# Patient Record
Sex: FEMALE | Race: OTHER | NOT HISPANIC OR LATINO | URBAN - METROPOLITAN AREA
[De-identification: names, ages, dates, MRNs, and addresses within clinical notes are randomized per-mention and may not be internally consistent; named-entity substitution may affect disease eponyms.]

---

## 2021-09-08 ENCOUNTER — OFFICE VISIT (OUTPATIENT)
Dept: URBAN - METROPOLITAN AREA TELEHEALTH 2 | Facility: TELEHEALTH | Age: 61
End: 2021-09-08
Payer: MEDICARE

## 2021-09-08 ENCOUNTER — LAB OUTSIDE AN ENCOUNTER (OUTPATIENT)
Dept: URBAN - METROPOLITAN AREA TELEHEALTH 2 | Facility: TELEHEALTH | Age: 61
End: 2021-09-08

## 2021-09-08 ENCOUNTER — TELEPHONE ENCOUNTER (OUTPATIENT)
Dept: URBAN - NONMETROPOLITAN AREA CLINIC 13 | Facility: CLINIC | Age: 61
End: 2021-09-08

## 2021-09-08 DIAGNOSIS — R13.19 CERVICAL DYSPHAGIA: ICD-10-CM

## 2021-09-08 DIAGNOSIS — K21.00 GASTROESOPHAGEAL REFLUX DISEASE WITH ESOPHAGITIS WITHOUT HEMORRHAGE: ICD-10-CM

## 2021-09-08 DIAGNOSIS — Z86.010 PERSONAL HISTORY OF COLONIC POLYPS: ICD-10-CM

## 2021-09-08 PROCEDURE — 99214 OFFICE O/P EST MOD 30 MIN: CPT | Performed by: NURSE PRACTITIONER

## 2021-09-08 RX ORDER — ESOMEPRAZOLE MAGNESIUM 40 MG
TAKE 1 CAPSULE (40 MG) BY ORAL ROUTE ONCE DAILY CAPSULE,DELAYED RELEASE (ENTERIC COATED) ORAL
Qty: 90 | Refills: 3 | Status: ON HOLD | COMMUNITY
Start: 2019-04-19

## 2021-09-08 RX ORDER — ESOMEPRAZOLE MAGNESIUM 20 MG/1
1 CAPSULE CAPSULE, DELAYED RELEASE ORAL ONCE A DAY
Qty: 90 CAPSULE | Refills: 3 | OUTPATIENT
Start: 2021-09-08

## 2021-09-08 RX ORDER — SERTRALINE HCL 50 MG
TABLET ORAL
Qty: 0 | Refills: 0 | Status: ACTIVE | COMMUNITY
Start: 1900-01-01

## 2021-09-08 RX ORDER — BUPROPION HYDROCHLORIDE 150 MG/1
TABLET, FILM COATED ORAL
Qty: 0 | Refills: 0 | Status: ACTIVE | COMMUNITY
Start: 1900-01-01

## 2021-09-08 NOTE — HPI-TODAY'S VISIT:
Keira presents for follow up of reflux, dysphagia and colon cancer screening. She states she has had increased dysphagia. This is worse to solids. She is off nexium 40 mg daily. She states she does have some breakthrough reflux. Her bowels are moving regularly on magnesium supplements. Her last EGD was in 2018 with schatsky's ring s/p dilation. She does feel like she needs a repeat dilation. Her last colonoscopy was in 2010 by Dr. Gonzalez and normal. She does have a history of polyps. Today her main issues is dysphagia. MB

## 2021-12-01 ENCOUNTER — CLAIMS CREATED FROM THE CLAIM WINDOW (OUTPATIENT)
Dept: URBAN - METROPOLITAN AREA CLINIC 4 | Facility: CLINIC | Age: 61
End: 2021-12-01
Payer: MEDICARE

## 2021-12-01 ENCOUNTER — OFFICE VISIT (OUTPATIENT)
Dept: URBAN - NONMETROPOLITAN AREA SURGERY CENTER 1 | Facility: SURGERY CENTER | Age: 61
End: 2021-12-01
Payer: MEDICARE

## 2021-12-01 DIAGNOSIS — K21.9 GASTRO-ESOPHAGEAL REFLUX DISEASE WITHOUT ESOPHAGITIS: ICD-10-CM

## 2021-12-01 DIAGNOSIS — K31.89 DEFORMED PYLORUS, ACQUIRED: ICD-10-CM

## 2021-12-01 DIAGNOSIS — K31.89 ACQUIRED DEFORMITY OF DUODENUM: ICD-10-CM

## 2021-12-01 DIAGNOSIS — K29.70 GASTRITIS, UNSPECIFIED, WITHOUT BLEEDING: ICD-10-CM

## 2021-12-01 DIAGNOSIS — K22.2 ACQUIRED ESOPHAGEAL RING: ICD-10-CM

## 2021-12-01 DIAGNOSIS — K21.9 ACID REFLUX: ICD-10-CM

## 2021-12-01 PROCEDURE — 43249 ESOPH EGD DILATION <30 MM: CPT | Performed by: INTERNAL MEDICINE

## 2021-12-01 PROCEDURE — 88312 SPECIAL STAINS GROUP 1: CPT | Performed by: PATHOLOGY

## 2021-12-01 PROCEDURE — 43239 EGD BIOPSY SINGLE/MULTIPLE: CPT | Performed by: INTERNAL MEDICINE

## 2021-12-01 PROCEDURE — G8907 PT DOC NO EVENTS ON DISCHARG: HCPCS | Performed by: INTERNAL MEDICINE

## 2021-12-01 PROCEDURE — 88305 TISSUE EXAM BY PATHOLOGIST: CPT | Performed by: PATHOLOGY

## 2021-12-29 ENCOUNTER — WEB ENCOUNTER (OUTPATIENT)
Dept: URBAN - NONMETROPOLITAN AREA CLINIC 2 | Facility: CLINIC | Age: 61
End: 2021-12-29

## 2021-12-29 RX ORDER — ESOMEPRAZOLE MAGNESIUM 20 MG/1
1 CAPSULE CAPSULE, DELAYED RELEASE ORAL ONCE A DAY
Qty: 90 CAPSULE | Refills: 3
Start: 2021-09-08

## 2022-01-03 ENCOUNTER — WEB ENCOUNTER (OUTPATIENT)
Dept: URBAN - NONMETROPOLITAN AREA CLINIC 2 | Facility: CLINIC | Age: 62
End: 2022-01-03

## 2023-08-30 ENCOUNTER — WEB ENCOUNTER (OUTPATIENT)
Dept: URBAN - NONMETROPOLITAN AREA CLINIC 2 | Facility: CLINIC | Age: 63
End: 2023-08-30

## 2023-08-30 RX ORDER — ESOMEPRAZOLE MAGNESIUM 20 MG/1
1 CAPSULE CAPSULE, DELAYED RELEASE ORAL ONCE A DAY
Qty: 90 CAPSULE | Refills: 3
Start: 2021-09-08

## 2023-09-05 ENCOUNTER — OFFICE VISIT (OUTPATIENT)
Dept: URBAN - NONMETROPOLITAN AREA CLINIC 2 | Facility: CLINIC | Age: 63
End: 2023-09-05

## 2023-09-13 ENCOUNTER — TELEPHONE ENCOUNTER (OUTPATIENT)
Dept: URBAN - NONMETROPOLITAN AREA CLINIC 2 | Facility: CLINIC | Age: 63
End: 2023-09-13

## 2023-09-15 ENCOUNTER — LAB OUTSIDE AN ENCOUNTER (OUTPATIENT)
Dept: URBAN - NONMETROPOLITAN AREA CLINIC 2 | Facility: CLINIC | Age: 63
End: 2023-09-15

## 2023-09-15 ENCOUNTER — OFFICE VISIT (OUTPATIENT)
Dept: URBAN - NONMETROPOLITAN AREA CLINIC 2 | Facility: CLINIC | Age: 63
End: 2023-09-15
Payer: MEDICARE

## 2023-09-15 ENCOUNTER — WEB ENCOUNTER (OUTPATIENT)
Dept: URBAN - NONMETROPOLITAN AREA CLINIC 2 | Facility: CLINIC | Age: 63
End: 2023-09-15

## 2023-09-15 VITALS
DIASTOLIC BLOOD PRESSURE: 93 MMHG | SYSTOLIC BLOOD PRESSURE: 149 MMHG | HEIGHT: 64 IN | TEMPERATURE: 98.7 F | BODY MASS INDEX: 27.79 KG/M2 | HEART RATE: 73 BPM | WEIGHT: 162.8 LBS

## 2023-09-15 DIAGNOSIS — R13.10 DYSPHAGIA: ICD-10-CM

## 2023-09-15 DIAGNOSIS — K21.00 GASTROESOPHAGEAL REFLUX DISEASE WITH ESOPHAGITIS WITHOUT HEMORRHAGE: ICD-10-CM

## 2023-09-15 DIAGNOSIS — K31.1 PYLORIC STENOSIS: ICD-10-CM

## 2023-09-15 DIAGNOSIS — K62.5 RECTAL BLEEDING: ICD-10-CM

## 2023-09-15 PROBLEM — 367403001: Status: ACTIVE | Noted: 2023-09-15

## 2023-09-15 PROBLEM — 447731000: Status: ACTIVE | Noted: 2023-09-15

## 2023-09-15 PROBLEM — 266433003: Status: ACTIVE | Noted: 2021-09-08

## 2023-09-15 PROCEDURE — 99214 OFFICE O/P EST MOD 30 MIN: CPT | Performed by: NURSE PRACTITIONER

## 2023-09-15 RX ORDER — SOD SULF/POT CHLORIDE/MAG SULF 1.479 G
12 TABLETS THE FIRST DOSE THE EVENING BEFORE AND SECOND DOSE THE MORNING OF COLONOSCOPY TABLET ORAL TWICE A DAY
Qty: 24 TABLET | Refills: 0 | OUTPATIENT
Start: 2023-09-15 | End: 2023-09-16

## 2023-09-15 RX ORDER — HYDROCORTISONE ACETATE 25 MG/1
1 SUPPOSITORY SUPPOSITORY RECTAL THREE TIMES A DAY
Qty: 42 SUPPOSITORIES | Refills: 0 | OUTPATIENT
Start: 2023-09-15 | End: 2023-09-29

## 2023-09-15 RX ORDER — ESOMEPRAZOLE MAGNESIUM 40 MG
TAKE 1 CAPSULE (40 MG) BY ORAL ROUTE ONCE DAILY CAPSULE,DELAYED RELEASE (ENTERIC COATED) ORAL
Qty: 90 | Refills: 3 | Status: ON HOLD | COMMUNITY
Start: 2019-04-19

## 2023-09-15 RX ORDER — SERTRALINE HCL 50 MG
TABLET ORAL
Qty: 0 | Refills: 0 | Status: ACTIVE | COMMUNITY
Start: 1900-01-01

## 2023-09-15 RX ORDER — BUPROPION HYDROCHLORIDE 150 MG/1
TABLET, FILM COATED ORAL
Qty: 0 | Refills: 0 | Status: ACTIVE | COMMUNITY
Start: 1900-01-01

## 2023-09-15 RX ORDER — ESOMEPRAZOLE MAGNESIUM 20 MG/1
1 CAPSULE CAPSULE, DELAYED RELEASE ORAL ONCE A DAY
Qty: 90 CAPSULE | Refills: 3 | Status: ACTIVE | COMMUNITY
Start: 2021-09-08

## 2023-09-15 NOTE — HPI-TODAY'S VISIT:
Keira presents for follow up of reflux, dysphagia and colon cancer screening. She states she has had increased dysphagia. This is worse to solids. She is off nexium 40 mg daily. She states she does have some breakthrough reflux. Her bowels are moving regularly on magnesium supplements. Her last EGD was in 2018 with schatsky's ring s/p dilation. She does feel like she needs a repeat dilation. Her last colonoscopy was in 2010 by Dr. Gonzalez and normal. She does have a history of polyps. Today her main issues is dysphagia. MB 9/15/2023 Keira presents for evaluation of new onset rectal bleeding.  Last week she developed bright red blood per rectum x3 days.  Her last colonoscopy was in 2010 with fair prep.  When she was seen in 2018 and 2021 we have discussed repeating however she did not follow-up.  She denies any change in bowel habits.  Her reflux and dysphagia are stable on Nexium 20 mg just as needed.  Today she does agree to labs, she does have a history of hemorrhoids status post banding x2 in the past.  We will start steroid suppositories and schedule colonoscopy.  MB

## 2023-09-17 LAB
ABSOLUTE BASOPHILS: 21
ABSOLUTE EOSINOPHILS: 80
ABSOLUTE LYMPHOCYTES: 1765
ABSOLUTE MONOCYTES: 419
ABSOLUTE NEUTROPHILS: 3016
BASOPHILS: 0.4
EOSINOPHILS: 1.5
HEMATOCRIT: 41.8
HEMOGLOBIN: 13.8
LYMPHOCYTES: 33.3
MCH: 29.1
MCHC: 33
MCV: 88.2
MONOCYTES: 7.9
MPV: 9.4
NEUTROPHILS: 56.9
PLATELET COUNT: 248
RDW: 12.3
RED BLOOD CELL COUNT: 4.74
WHITE BLOOD CELL COUNT: 5.3

## 2023-09-20 ENCOUNTER — CLAIMS CREATED FROM THE CLAIM WINDOW (OUTPATIENT)
Dept: URBAN - NONMETROPOLITAN AREA SURGERY CENTER 1 | Facility: SURGERY CENTER | Age: 63
End: 2023-09-20
Payer: MEDICARE

## 2023-09-20 DIAGNOSIS — Z12.11 COLON CANCER SCREENING: ICD-10-CM

## 2023-09-20 DIAGNOSIS — K64.0 FIRST DEGREE HEMORRHOIDS: ICD-10-CM

## 2023-09-20 DIAGNOSIS — Z83.71 FAMILY HISTORY OF POLYPS IN THE COLON: ICD-10-CM

## 2023-09-20 PROCEDURE — G8907 PT DOC NO EVENTS ON DISCHARG: HCPCS | Performed by: INTERNAL MEDICINE

## 2023-09-20 PROCEDURE — 00811 ANES LWR INTST NDSC NOS: CPT | Performed by: NURSE ANESTHETIST, CERTIFIED REGISTERED

## 2023-09-20 PROCEDURE — G0121 COLON CA SCRN NOT HI RSK IND: HCPCS | Performed by: INTERNAL MEDICINE

## 2023-12-06 ENCOUNTER — OFFICE VISIT (OUTPATIENT)
Dept: URBAN - METROPOLITAN AREA TELEHEALTH 2 | Facility: TELEHEALTH | Age: 63
End: 2023-12-06
Payer: MEDICARE

## 2023-12-06 ENCOUNTER — DASHBOARD ENCOUNTERS (OUTPATIENT)
Age: 63
End: 2023-12-06

## 2023-12-06 DIAGNOSIS — K62.5 RECTAL BLEEDING: ICD-10-CM

## 2023-12-06 DIAGNOSIS — K21.00 GASTROESOPHAGEAL REFLUX DISEASE WITH ESOPHAGITIS WITHOUT HEMORRHAGE: ICD-10-CM

## 2023-12-06 DIAGNOSIS — K59.04 CHRONIC IDIOPATHIC CONSTIPATION: ICD-10-CM

## 2023-12-06 DIAGNOSIS — R13.10 DYSPHAGIA: ICD-10-CM

## 2023-12-06 PROBLEM — 428283002 HISTORY OF POLYP OF COLON (SITUATION): Status: ACTIVE | Noted: 2021-09-08

## 2023-12-06 PROBLEM — 82934008: Status: ACTIVE | Noted: 2023-12-06

## 2023-12-06 PROBLEM — 12063002: Status: ACTIVE | Noted: 2023-09-15

## 2023-12-06 PROCEDURE — 99214 OFFICE O/P EST MOD 30 MIN: CPT | Performed by: NURSE PRACTITIONER

## 2023-12-06 RX ORDER — BUPROPION HYDROCHLORIDE 150 MG/1
TABLET, FILM COATED ORAL
Qty: 0 | Refills: 0 | Status: ACTIVE | COMMUNITY
Start: 1900-01-01

## 2023-12-06 RX ORDER — ESOMEPRAZOLE MAGNESIUM 20 MG/1
1 CAPSULE CAPSULE, DELAYED RELEASE ORAL ONCE A DAY
Qty: 90 CAPSULE | Refills: 3 | Status: ACTIVE | COMMUNITY
Start: 2021-09-08

## 2023-12-06 RX ORDER — HYDROCORTISONE ACETATE 25 MG/1
1 SUPPOSITORY SUPPOSITORY RECTAL THREE TIMES A DAY
Qty: 42 SUPPOSITORIES | Refills: 0 | OUTPATIENT
Start: 2023-12-06 | End: 2023-12-20

## 2023-12-06 RX ORDER — SERTRALINE HCL 50 MG
TABLET ORAL
Qty: 0 | Refills: 0 | Status: ACTIVE | COMMUNITY
Start: 1900-01-01

## 2023-12-06 RX ORDER — ESOMEPRAZOLE MAGNESIUM 40 MG
TAKE 1 CAPSULE (40 MG) BY ORAL ROUTE ONCE DAILY CAPSULE,DELAYED RELEASE (ENTERIC COATED) ORAL
Qty: 90 | Refills: 3 | Status: ON HOLD | COMMUNITY
Start: 2019-04-19

## 2023-12-06 NOTE — HPI-TODAY'S VISIT:
Keira presents for follow up of reflux, dysphagia and colon cancer screening. She states she has had increased dysphagia. This is worse to solids. She is off nexium 40 mg daily. She states she does have some breakthrough reflux. Her bowels are moving regularly on magnesium supplements. Her last EGD was in 2018 with schatsky's ring s/p dilation. She does feel like she needs a repeat dilation. Her last colonoscopy was in 2010 by Dr. Gonzalez and normal. She does have a history of polyps. Today her main issues is dysphagia. MB 9/15/2023 Keira presents for evaluation of new onset rectal bleeding.  Last week she developed bright red blood per rectum x3 days.  Her last colonoscopy was in 2010 with fair prep.  When she was seen in 2018 and 2021 we have discussed repeating however she did not follow-up.  She denies any change in bowel habits.  Her reflux and dysphagia are stable on Nexium 20 mg just as needed.  Today she does agree to labs, she does have a history of hemorrhoids status post banding x2 in the past.  We will start steroid suppositories and schedule colonoscopy.  MB 12/6/2023 Keira presents for colonoscopy follow-up.  Her colonoscopy revealed poor prep and grade 1 hemorrhoids.  Her bleeding has resolved but she still has narrow stools.  She is traveling in [place] for the next few months and reports increased constipation.  Today she agrees to add low-dose fiber and half capful of MiraLAX daily.  I am going to call in a steroid suppository for 2 weeks to improve with the quality and caliber of her bowels, I suspect the narrowing is likely from internal hemorrhoids.  Dr. Fishman recommended repeat colonoscopy in 1 year due to poor prep.  Overall she is doing fairly well in regard to her GI complaints.  MB

## 2024-01-30 ENCOUNTER — OFFICE VISIT (OUTPATIENT)
Dept: URBAN - NONMETROPOLITAN AREA CLINIC 2 | Facility: CLINIC | Age: 64
End: 2024-01-30

## 2024-06-20 ENCOUNTER — WEB ENCOUNTER (OUTPATIENT)
Dept: URBAN - NONMETROPOLITAN AREA CLINIC 13 | Facility: CLINIC | Age: 64
End: 2024-06-20

## 2024-06-26 ENCOUNTER — LAB OUTSIDE AN ENCOUNTER (OUTPATIENT)
Dept: URBAN - NONMETROPOLITAN AREA CLINIC 13 | Facility: CLINIC | Age: 64
End: 2024-06-26

## 2024-06-26 ENCOUNTER — OFFICE VISIT (OUTPATIENT)
Dept: URBAN - NONMETROPOLITAN AREA CLINIC 13 | Facility: CLINIC | Age: 64
End: 2024-06-26
Payer: MEDICARE

## 2024-06-26 VITALS
HEART RATE: 73 BPM | BODY MASS INDEX: 28.89 KG/M2 | WEIGHT: 169.2 LBS | HEIGHT: 64 IN | SYSTOLIC BLOOD PRESSURE: 155 MMHG | DIASTOLIC BLOOD PRESSURE: 94 MMHG

## 2024-06-26 DIAGNOSIS — K21.00 GASTROESOPHAGEAL REFLUX DISEASE WITH ESOPHAGITIS WITHOUT HEMORRHAGE: ICD-10-CM

## 2024-06-26 DIAGNOSIS — K59.04 CHRONIC IDIOPATHIC CONSTIPATION: ICD-10-CM

## 2024-06-26 DIAGNOSIS — R13.19 DYSPHAGIA: ICD-10-CM

## 2024-06-26 DIAGNOSIS — K31.1 PYLORIC STENOSIS: ICD-10-CM

## 2024-06-26 PROCEDURE — 99214 OFFICE O/P EST MOD 30 MIN: CPT | Performed by: NURSE PRACTITIONER

## 2024-06-26 RX ORDER — ESOMEPRAZOLE MAGNESIUM 40 MG/1
1 CAPSULE CAPSULE, DELAYED RELEASE ORAL ONCE A DAY
Qty: 90 CAPSULE | Refills: 3
Start: 2021-09-08

## 2024-06-26 RX ORDER — FAMOTIDINE 40 MG/1
1 TABLET BEFORE SUPPER TABLET, FILM COATED ORAL ONCE A DAY
Qty: 90 TABLET | Refills: 3 | OUTPATIENT
Start: 2024-06-26

## 2024-06-26 RX ORDER — ESOMEPRAZOLE MAGNESIUM 20 MG/1
1 CAPSULE CAPSULE, DELAYED RELEASE ORAL ONCE A DAY
Qty: 90 CAPSULE | Refills: 3 | Status: ACTIVE | COMMUNITY
Start: 2021-09-08

## 2024-06-26 RX ORDER — SERTRALINE HCL 50 MG
TABLET ORAL
Qty: 0 | Refills: 0 | Status: ACTIVE | COMMUNITY
Start: 1900-01-01

## 2024-06-26 RX ORDER — ESOMEPRAZOLE MAGNESIUM 40 MG
TAKE 1 CAPSULE (40 MG) BY ORAL ROUTE ONCE DAILY CAPSULE,DELAYED RELEASE (ENTERIC COATED) ORAL
Qty: 90 | Refills: 3 | Status: ON HOLD | COMMUNITY
Start: 2019-04-19

## 2024-06-26 RX ORDER — BUPROPION HYDROCHLORIDE 150 MG/1
TABLET, FILM COATED ORAL
Qty: 0 | Refills: 0 | Status: ACTIVE | COMMUNITY
Start: 1900-01-01

## 2024-06-26 RX ORDER — LINACLOTIDE 72 UG/1
1 CAPSULE AT LEAST 30 MINUTES BEFORE THE FIRST MEAL OF THE DAY ON AN EMPTY STOMACH CAPSULE, GELATIN COATED ORAL ONCE A DAY
Qty: 90 CAPSULE | Refills: 3 | OUTPATIENT
Start: 2024-06-26 | End: 2025-06-21

## 2024-06-26 RX ORDER — HYDROCORTISONE ACETATE 25 MG/1
1 SUPPOSITORY SUPPOSITORY RECTAL THREE TIMES A DAY
Qty: 42 SUPPOSITORIES | Refills: 1 | OUTPATIENT
Start: 2024-06-26 | End: 2024-07-24

## 2024-06-26 RX ORDER — SODIUM, POTASSIUM,MAG SULFATES 17.5-3.13G
177 MILLILITER SOLUTION, RECONSTITUTED, ORAL ORAL
Qty: 1 UNSPECIFIED | Refills: 0 | OUTPATIENT
Start: 2024-06-26 | End: 2024-06-27

## 2024-06-26 NOTE — PHYSICAL EXAM HENT:
Head , normocephalic , atraumatic Duration Of Freeze Thaw-Cycle (Seconds): 3 Post-Care Instructions: I reviewed with the patient in detail post-care instructions. Patient is to wear sunprotection, and avoid picking at any of the treated lesions. Pt may apply Vaseline to crusted or scabbing areas. Render Note In Bullet Format When Appropriate: No Consent: The patient's consent was obtained including but not limited to risks of crusting, scabbing, blistering, scarring, darker or lighter pigmentary change, recurrence, incomplete removal and infection. Detail Level: Detailed Number Of Freeze-Thaw Cycles: 3 freeze-thaw cycles Medical Necessity Clause: This procedure was medically necessary because the lesions that were treated were: Include Z78.9 (Other Specified Conditions Influencing Health Status) As An Associated Diagnosis?: Yes Medical Necessity Information: It is in your best interest to select a reason for this procedure from the list below. All of these items fulfill various CMS LCD requirements except the new and changing color options. 5a

## 2024-06-26 NOTE — HPI-TODAY'S VISIT:
Keira presents for follow up of reflux, dysphagia and colon cancer screening. She states she has had increased dysphagia. This is worse to solids. She is off nexium 40 mg daily. She states she does have some breakthrough reflux. Her bowels are moving regularly on magnesium supplements. Her last EGD was in 2018 with schatsky's ring s/p dilation. She does feel like she needs a repeat dilation. Her last colonoscopy was in 2010 by Dr. Gonzalez and normal. She does have a history of polyps. Today her main issues is dysphagia. MB 9/15/2023 Keira presents for evaluation of new onset rectal bleeding.  Last week she developed bright red blood per rectum x3 days.  Her last colonoscopy was in 2010 with fair prep.  When she was seen in 2018 and 2021 we have discussed repeating however she did not follow-up.  She denies any change in bowel habits.  Her reflux and dysphagia are stable on Nexium 20 mg just as needed.  Today she does agree to labs, she does have a history of hemorrhoids status post banding x2 in the past.  We will start steroid suppositories and schedule colonoscopy.  MB 12/6/2023 Keira presents for colonoscopy follow-up.  Her colonoscopy revealed poor prep and grade 1 hemorrhoids.  Her bleeding has resolved but she still has narrow stools.  She is traveling in [place] for the next few months and reports increased constipation.  Today she agrees to add low-dose fiber and half capful of MiraLAX daily.  I am going to call in a steroid suppository for 2 weeks to improve with the quality and caliber of her bowels, I suspect the narrowing is likely from internal hemorrhoids.  Dr. Fishman recommended repeat colonoscopy in 1 year due to poor prep.  Overall she is doing fairly well in regard to her GI complaints.  MB 6/26/2024 Keira presents for follow-up of history of reflux with worsening cough with swallowing, constipation, and personal history of colon polyps.  Since her last visit she was unable to take the MiraLAX without vomiting.  She is on Colace with incomplete evacuation.  She agrees to continue psyllium in the evening and add low-dose Linzess.  She is struggling with her hemorrhoids despite hydrocortisone suppositories.  We will refer to Dr. Kennedy for possible banding.  She reports increased cough after drinking cold things or heavy laughing.  She does have a history of reflux and gastritis on her previous EGD.  She agrees to a trial of 40 mg of Nexium in the morning and famotidine 40 mg before supper, consider repeat upper endoscopy if no relief versus pulmonary referral.  Today she is doing fairly well otherwise.  She does have multiple GI complaints.  MB

## 2024-07-01 ENCOUNTER — WEB ENCOUNTER (OUTPATIENT)
Dept: URBAN - NONMETROPOLITAN AREA CLINIC 13 | Facility: CLINIC | Age: 64
End: 2024-07-01

## 2024-08-12 ENCOUNTER — WEB ENCOUNTER (OUTPATIENT)
Dept: URBAN - NONMETROPOLITAN AREA CLINIC 2 | Facility: CLINIC | Age: 64
End: 2024-08-12

## 2024-08-12 RX ORDER — SUCRALFATE 1 G/1
1 TABLET CRUSHED AND MIXED WITH 30ML OF WATER TABLET ORAL
Qty: 180 TABLET | Refills: 3 | OUTPATIENT
Start: 2024-08-21 | End: 2025-08-16

## 2024-08-19 ENCOUNTER — WEB ENCOUNTER (OUTPATIENT)
Dept: URBAN - NONMETROPOLITAN AREA CLINIC 2 | Facility: CLINIC | Age: 64
End: 2024-08-19

## 2024-08-21 ENCOUNTER — LAB OUTSIDE AN ENCOUNTER (OUTPATIENT)
Dept: URBAN - NONMETROPOLITAN AREA CLINIC 2 | Facility: CLINIC | Age: 64
End: 2024-08-21

## 2024-09-27 ENCOUNTER — OFFICE VISIT (OUTPATIENT)
Dept: URBAN - METROPOLITAN AREA MEDICAL CENTER 1 | Facility: MEDICAL CENTER | Age: 64
End: 2024-09-27

## 2024-09-27 RX ORDER — SERTRALINE HCL 50 MG
TABLET ORAL
Qty: 0 | Refills: 0 | Status: ACTIVE | COMMUNITY
Start: 1900-01-01

## 2024-09-27 RX ORDER — SUCRALFATE 1 G/1
1 TABLET CRUSHED AND MIXED WITH 30ML OF WATER TABLET ORAL
Qty: 180 TABLET | Refills: 3 | Status: ACTIVE | COMMUNITY
Start: 2024-08-21 | End: 2025-08-16

## 2024-09-27 RX ORDER — LINACLOTIDE 72 UG/1
1 CAPSULE AT LEAST 30 MINUTES BEFORE THE FIRST MEAL OF THE DAY ON AN EMPTY STOMACH CAPSULE, GELATIN COATED ORAL ONCE A DAY
Qty: 90 CAPSULE | Refills: 3 | Status: ACTIVE | COMMUNITY
Start: 2024-06-26 | End: 2025-06-21

## 2024-09-27 RX ORDER — ESOMEPRAZOLE MAGNESIUM 40 MG
TAKE 1 CAPSULE (40 MG) BY ORAL ROUTE ONCE DAILY CAPSULE,DELAYED RELEASE (ENTERIC COATED) ORAL
Qty: 90 | Refills: 3 | Status: ON HOLD | COMMUNITY
Start: 2019-04-19

## 2024-09-27 RX ORDER — FAMOTIDINE 40 MG/1
1 TABLET BEFORE SUPPER TABLET, FILM COATED ORAL ONCE A DAY
Qty: 90 TABLET | Refills: 3 | Status: ACTIVE | COMMUNITY
Start: 2024-06-26

## 2024-09-27 RX ORDER — BUPROPION HYDROCHLORIDE 150 MG/1
TABLET, FILM COATED ORAL
Qty: 0 | Refills: 0 | Status: ACTIVE | COMMUNITY
Start: 1900-01-01

## 2024-09-27 RX ORDER — ESOMEPRAZOLE MAGNESIUM 40 MG/1
1 CAPSULE CAPSULE, DELAYED RELEASE ORAL ONCE A DAY
Qty: 90 CAPSULE | Refills: 3 | Status: ACTIVE | COMMUNITY
Start: 2021-09-08

## 2024-10-21 ENCOUNTER — OFFICE VISIT (OUTPATIENT)
Dept: URBAN - NONMETROPOLITAN AREA CLINIC 2 | Facility: CLINIC | Age: 64
End: 2024-10-21
Payer: MEDICARE

## 2024-10-21 VITALS
DIASTOLIC BLOOD PRESSURE: 80 MMHG | HEIGHT: 64 IN | HEART RATE: 70 BPM | SYSTOLIC BLOOD PRESSURE: 129 MMHG | WEIGHT: 173 LBS | BODY MASS INDEX: 29.53 KG/M2

## 2024-10-21 DIAGNOSIS — K21.00 GASTROESOPHAGEAL REFLUX DISEASE WITH ESOPHAGITIS WITHOUT HEMORRHAGE: ICD-10-CM

## 2024-10-21 DIAGNOSIS — R13.19 DYSPHAGIA: ICD-10-CM

## 2024-10-21 DIAGNOSIS — K62.5 RECTAL BLEEDING: ICD-10-CM

## 2024-10-21 DIAGNOSIS — Z86.0101 PERSONAL HISTORY OF ADENOMATOUS AND SERRATED COLON POLYPS: ICD-10-CM

## 2024-10-21 DIAGNOSIS — Z09 ENCOUNTER FOR FOLLOW-UP: ICD-10-CM

## 2024-10-21 DIAGNOSIS — K59.04 CHRONIC IDIOPATHIC CONSTIPATION: ICD-10-CM

## 2024-10-21 DIAGNOSIS — K31.1 PYLORIC STENOSIS: ICD-10-CM

## 2024-10-21 PROBLEM — 428283002: Status: ACTIVE | Noted: 2024-10-21

## 2024-10-21 PROCEDURE — 99214 OFFICE O/P EST MOD 30 MIN: CPT | Performed by: NURSE PRACTITIONER

## 2024-10-21 RX ORDER — LINACLOTIDE 72 UG/1
1 CAPSULE AT LEAST 30 MINUTES BEFORE THE FIRST MEAL OF THE DAY ON AN EMPTY STOMACH CAPSULE, GELATIN COATED ORAL ONCE A DAY
Qty: 90 CAPSULE | Refills: 3 | Status: ACTIVE | COMMUNITY
Start: 2024-06-26 | End: 2025-06-21

## 2024-10-21 RX ORDER — LINACLOTIDE 72 UG/1
1 CAPSULE AT LEAST 30 MINUTES BEFORE THE FIRST MEAL OF THE DAY ON AN EMPTY STOMACH CAPSULE, GELATIN COATED ORAL ONCE A DAY
Qty: 90 CAPSULE | Refills: 3
Start: 2024-06-26 | End: 2025-10-16

## 2024-10-21 RX ORDER — ESOMEPRAZOLE MAGNESIUM 40 MG
TAKE 1 CAPSULE (40 MG) BY ORAL ROUTE ONCE DAILY CAPSULE,DELAYED RELEASE (ENTERIC COATED) ORAL
Qty: 90 | Refills: 3 | Status: ON HOLD | COMMUNITY
Start: 2019-04-19

## 2024-10-21 RX ORDER — SERTRALINE HCL 50 MG
TABLET ORAL
Qty: 0 | Refills: 0 | Status: ACTIVE | COMMUNITY
Start: 1900-01-01

## 2024-10-21 RX ORDER — ESOMEPRAZOLE MAGNESIUM 20 MG/1
1 CAPSULE CAPSULE, DELAYED RELEASE PELLETS ORAL ONCE A DAY
Qty: 90 CAPSULE | Refills: 3
Start: 2021-09-08

## 2024-10-21 RX ORDER — FAMOTIDINE 40 MG/1
1 TABLET BEFORE SUPPER TABLET, FILM COATED ORAL ONCE A DAY
Qty: 90 TABLET | Refills: 3 | Status: ACTIVE | COMMUNITY
Start: 2024-06-26

## 2024-10-21 RX ORDER — SUCRALFATE 1 G/1
1 TABLET CRUSHED AND MIXED WITH 30ML OF WATER TABLET ORAL
Qty: 180 TABLET | Refills: 3 | Status: ACTIVE | COMMUNITY
Start: 2024-08-21 | End: 2025-08-16

## 2024-10-21 RX ORDER — BUPROPION HYDROCHLORIDE 150 MG/1
TABLET, FILM COATED ORAL
Qty: 0 | Refills: 0 | Status: ACTIVE | COMMUNITY
Start: 1900-01-01

## 2024-10-21 RX ORDER — ESOMEPRAZOLE MAGNESIUM 40 MG/1
1 CAPSULE CAPSULE, DELAYED RELEASE ORAL ONCE A DAY
Qty: 90 CAPSULE | Refills: 3 | Status: ACTIVE | COMMUNITY
Start: 2021-09-08

## 2024-10-21 NOTE — HPI-TODAY'S VISIT:
Keira presents for follow up of reflux, dysphagia and colon cancer screening. She states she has had increased dysphagia. This is worse to solids. She is off nexium 40 mg daily. She states she does have some breakthrough reflux. Her bowels are moving regularly on magnesium supplements. Her last EGD was in 2018 with schatsky's ring s/p dilation. She does feel like she needs a repeat dilation. Her last colonoscopy was in 2010 by Dr. Gonzalez and normal. She does have a history of polyps. Today her main issues is dysphagia. MB 9/15/2023 Keira presents for evaluation of new onset rectal bleeding.  Last week she developed bright red blood per rectum x3 days.  Her last colonoscopy was in 2010 with fair prep.  When she was seen in 2018 and 2021 we have discussed repeating however she did not follow-up.  She denies any change in bowel habits.  Her reflux and dysphagia are stable on Nexium 20 mg just as needed.  Today she does agree to labs, she does have a history of hemorrhoids status post banding x2 in the past.  We will start steroid suppositories and schedule colonoscopy.  MB 12/6/2023 Keira presents for colonoscopy follow-up.  Her colonoscopy revealed poor prep and grade 1 hemorrhoids.  Her bleeding has resolved but she still has narrow stools.  She is traveling in [place] for the next few months and reports increased constipation.  Today she agrees to add low-dose fiber and half capful of MiraLAX daily.  I am going to call in a steroid suppository for 2 weeks to improve with the quality and caliber of her bowels, I suspect the narrowing is likely from internal hemorrhoids.  Dr. Fishman recommended repeat colonoscopy in 1 year due to poor prep.  Overall she is doing fairly well in regard to her GI complaints.  MB 6/26/2024 Keira presents for follow-up of history of reflux with worsening cough with swallowing, constipation, and personal history of colon polyps.  Since her last visit she was unable to take the MiraLAX without vomiting.  She is on Colace with incomplete evacuation.  She agrees to continue psyllium in the evening and add low-dose Linzess.  She is struggling with her hemorrhoids despite hydrocortisone suppositories.  We will refer to Dr. Kennedy for possible banding.  She reports increased cough after drinking cold things or heavy laughing.  She does have a history of reflux and gastritis on her previous EGD.  She agrees to a trial of 40 mg of Nexium in the morning and famotidine 40 mg before supper, consider repeat upper endoscopy if no relief versus pulmonary referral.  Today she is doing fairly well otherwise.  She does have multiple GI complaints.  MB 10/21/2024 Keira presents for follow-up.  Her EGD shows duodenal diverticulum, her colonoscopy is normal.  Her reflux symptoms including cough have improved on PPI and famotidine, she agrees to wean her Nexium to 20 mg daily.  The Linzess 72 mcg does help her bowels, we did get this approved but she never picked this up from the pharmacy.  She does agree to start this daily.  Today she denies any new GI complaints.  MB

## 2024-10-21 NOTE — PHYSICAL EXAM NEUROLOGIC:
oriented to person, place and time ,  , cranial nerves 2-12 grossly intact
Pt states " A bin of grocery fell on my rt elbow today about 8 hrs ago and now it is very painful." Pt able to move arm, positive capillary refill and good sensation

## 2025-04-15 ENCOUNTER — WEB ENCOUNTER (OUTPATIENT)
Dept: URBAN - NONMETROPOLITAN AREA CLINIC 2 | Facility: CLINIC | Age: 65
End: 2025-04-15

## 2025-04-21 ENCOUNTER — OFFICE VISIT (OUTPATIENT)
Dept: URBAN - NONMETROPOLITAN AREA CLINIC 2 | Facility: CLINIC | Age: 65
End: 2025-04-21
Payer: MEDICARE

## 2025-04-21 DIAGNOSIS — R13.10 DYSPHAGIA: ICD-10-CM

## 2025-04-21 DIAGNOSIS — K59.04 CHRONIC IDIOPATHIC CONSTIPATION: ICD-10-CM

## 2025-04-21 DIAGNOSIS — D13.2 DUODENAL ADENOMA: ICD-10-CM

## 2025-04-21 DIAGNOSIS — K21.00 GASTROESOPHAGEAL REFLUX DISEASE WITH ESOPHAGITIS WITHOUT HEMORRHAGE: ICD-10-CM

## 2025-04-21 DIAGNOSIS — Z86.0101 PERSONAL HISTORY OF ADENOMATOUS AND SERRATED COLON POLYPS: ICD-10-CM

## 2025-04-21 DIAGNOSIS — K62.5 RECTAL BLEEDING: ICD-10-CM

## 2025-04-21 DIAGNOSIS — K31.1 PYLORIC STENOSIS: ICD-10-CM

## 2025-04-21 PROCEDURE — 99214 OFFICE O/P EST MOD 30 MIN: CPT | Performed by: NURSE PRACTITIONER

## 2025-04-21 RX ORDER — LINACLOTIDE 72 UG/1
1 CAPSULE AT LEAST 30 MINUTES BEFORE THE FIRST MEAL OF THE DAY ON AN EMPTY STOMACH CAPSULE, GELATIN COATED ORAL ONCE A DAY
Qty: 90 CAPSULE | Refills: 3 | Status: ACTIVE | COMMUNITY
Start: 2024-06-26 | End: 2025-10-16

## 2025-04-21 RX ORDER — FAMOTIDINE 40 MG/1
1 TABLET BEFORE SUPPER TABLET, FILM COATED ORAL ONCE A DAY
Qty: 90 TABLET | Refills: 3 | Status: ACTIVE | COMMUNITY
Start: 2024-06-26

## 2025-04-21 RX ORDER — HYDROCORTISONE ACETATE 30 MG/1
1 SUPPOSITORY SUPPOSITORY RECTAL TWICE A DAY
Qty: 60 | Refills: 3 | OUTPATIENT
Start: 2025-04-21 | End: 2025-08-19

## 2025-04-21 RX ORDER — SERTRALINE HCL 50 MG
TABLET ORAL
Qty: 0 | Refills: 0 | Status: ACTIVE | COMMUNITY
Start: 1900-01-01

## 2025-04-21 RX ORDER — PANTOPRAZOLE SODIUM 40 MG/1
1 TABLET TABLET, DELAYED RELEASE ORAL ONCE A DAY
Qty: 90 TABLET | Refills: 3 | OUTPATIENT
Start: 2025-04-21

## 2025-04-21 RX ORDER — ESOMEPRAZOLE MAGNESIUM 40 MG
TAKE 1 CAPSULE (40 MG) BY ORAL ROUTE ONCE DAILY CAPSULE,DELAYED RELEASE (ENTERIC COATED) ORAL
Qty: 90 | Refills: 3 | Status: ON HOLD | COMMUNITY
Start: 2019-04-19

## 2025-04-21 RX ORDER — ESOMEPRAZOLE MAGNESIUM 20 MG/1
1 CAPSULE CAPSULE, DELAYED RELEASE PELLETS ORAL ONCE A DAY
Qty: 90 CAPSULE | Refills: 3 | Status: ACTIVE | COMMUNITY
Start: 2021-09-08

## 2025-04-21 RX ORDER — BUPROPION HYDROCHLORIDE 150 MG/1
TABLET, FILM COATED ORAL
Qty: 0 | Refills: 0 | Status: ACTIVE | COMMUNITY
Start: 1900-01-01

## 2025-04-21 RX ORDER — SUCRALFATE 1 G/1
1 TABLET CRUSHED AND MIXED WITH 30ML OF WATER TABLET ORAL
Qty: 180 TABLET | Refills: 3 | Status: ACTIVE | COMMUNITY
Start: 2024-08-21 | End: 2025-08-16

## 2025-04-21 NOTE — HPI-TODAY'S VISIT:
Keira presents for follow up of reflux, dysphagia and colon cancer screening. She states she has had increased dysphagia. This is worse to solids. She is off nexium 40 mg daily. She states she does have some breakthrough reflux. Her bowels are moving regularly on magnesium supplements. Her last EGD was in 2018 with schatsky's ring s/p dilation. She does feel like she needs a repeat dilation. Her last colonoscopy was in 2010 by Dr. Gonzalez and normal. She does have a history of polyps. Today her main issues is dysphagia. MB 9/15/2023 Keira presents for evaluation of new onset rectal bleeding.  Last week she developed bright red blood per rectum x3 days.  Her last colonoscopy was in 2010 with fair prep.  When she was seen in 2018 and 2021 we have discussed repeating however she did not follow-up.  She denies any change in bowel habits.  Her reflux and dysphagia are stable on Nexium 20 mg just as needed.  Today she does agree to labs, she does have a history of hemorrhoids status post banding x2 in the past.  We will start steroid suppositories and schedule colonoscopy.  MB 12/6/2023 Keira presents for colonoscopy follow-up.  Her colonoscopy revealed poor prep and grade 1 hemorrhoids.  Her bleeding has resolved but she still has narrow stools.  She is traveling in [place] for the next few months and reports increased constipation.  Today she agrees to add low-dose fiber and half capful of MiraLAX daily.  I am going to call in a steroid suppository for 2 weeks to improve with the quality and caliber of her bowels, I suspect the narrowing is likely from internal hemorrhoids.  Dr. Fishman recommended repeat colonoscopy in 1 year due to poor prep.  Overall she is doing fairly well in regard to her GI complaints.  MB 6/26/2024 Keira presents for follow-up of history of reflux with worsening cough with swallowing, constipation, and personal history of colon polyps.  Since her last visit she was unable to take the MiraLAX without vomiting.  She is on Colace with incomplete evacuation.  She agrees to continue psyllium in the evening and add low-dose Linzess.  She is struggling with her hemorrhoids despite hydrocortisone suppositories.  We will refer to Dr. Kennedy for possible banding.  She reports increased cough after drinking cold things or heavy laughing.  She does have a history of reflux and gastritis on her previous EGD.  She agrees to a trial of 40 mg of Nexium in the morning and famotidine 40 mg before supper, consider repeat upper endoscopy if no relief versus pulmonary referral.  Today she is doing fairly well otherwise.  She does have multiple GI complaints.  MB 10/21/2024 Keira presents for follow-up.  Her EGD shows duodenal diverticulum, her colonoscopy is normal.  Her reflux symptoms including cough have improved on PPI and famotidine, she agrees to wean her Nexium to 20 mg daily.  The Linzess 72 mcg does help her bowels, we did get this approved but she never picked this up from the pharmacy.  She does agree to start this daily.  Today she denies any new GI complaints.  MB 4/21/2025  Keira presents for follow-up.  Since her last visit she has actually been doing well from a GI standpoint.  She developed dizziness with hypertensive emergency and ultimately diagnosed with a brain aneurysm undergoing vascular intervention by [facility].  She is on as omeprazole 40 mg for her reflux.  They are concerned about interaction with Plavix.  We will transition to pantoprazole 40 mg daily.  This has not worked for her in the past however this is the safest drug to take with the Plavix and she agrees to try this.  Her bowels are moving fairly regularly she still has some mild constipation on the low-dose of the Linzess, we will call in the 145 mcg dose.  Today she is doing well from a GI standpoint but anxious about her vascular surgery coming up.  MB

## 2025-05-08 ENCOUNTER — WEB ENCOUNTER (OUTPATIENT)
Dept: URBAN - NONMETROPOLITAN AREA CLINIC 2 | Facility: CLINIC | Age: 65
End: 2025-05-08

## 2025-05-10 ENCOUNTER — WEB ENCOUNTER (OUTPATIENT)
Dept: URBAN - NONMETROPOLITAN AREA CLINIC 2 | Facility: CLINIC | Age: 65
End: 2025-05-10

## 2025-05-16 ENCOUNTER — WEB ENCOUNTER (OUTPATIENT)
Dept: URBAN - NONMETROPOLITAN AREA CLINIC 2 | Facility: CLINIC | Age: 65
End: 2025-05-16

## 2025-05-16 RX ORDER — ESOMEPRAZOLE MAGNESIUM 40 MG/1
1 CAPSULE CAPSULE, DELAYED RELEASE ORAL TWICE A DAY
Qty: 180 CAPSULE | Refills: 3 | OUTPATIENT
Start: 2025-05-20

## 2025-05-20 ENCOUNTER — WEB ENCOUNTER (OUTPATIENT)
Dept: URBAN - NONMETROPOLITAN AREA CLINIC 2 | Facility: CLINIC | Age: 65
End: 2025-05-20

## 2025-06-05 ENCOUNTER — WEB ENCOUNTER (OUTPATIENT)
Dept: URBAN - NONMETROPOLITAN AREA CLINIC 2 | Facility: CLINIC | Age: 65
End: 2025-06-05

## 2025-06-23 ENCOUNTER — TELEPHONE ENCOUNTER (OUTPATIENT)
Dept: URBAN - NONMETROPOLITAN AREA CLINIC 2 | Facility: CLINIC | Age: 65
End: 2025-06-23

## 2025-06-23 RX ORDER — LINACLOTIDE 72 UG/1
1 CAPSULE AT LEAST 30 MINUTES BEFORE THE FIRST MEAL OF THE DAY ON AN EMPTY STOMACH CAPSULE, GELATIN COATED ORAL ONCE A DAY
Qty: 90 CAPSULE | Refills: 3
Start: 2024-06-26 | End: 2026-06-18

## 2025-07-31 ENCOUNTER — OFFICE VISIT (OUTPATIENT)
Dept: URBAN - NONMETROPOLITAN AREA CLINIC 2 | Facility: CLINIC | Age: 65
End: 2025-07-31

## 2025-07-31 RX ORDER — LINACLOTIDE 72 UG/1
1 CAPSULE AT LEAST 30 MINUTES BEFORE THE FIRST MEAL OF THE DAY ON AN EMPTY STOMACH CAPSULE, GELATIN COATED ORAL ONCE A DAY
Qty: 90 CAPSULE | Refills: 3 | Status: ACTIVE | COMMUNITY
Start: 2024-06-26 | End: 2026-06-18

## 2025-07-31 RX ORDER — ESOMEPRAZOLE MAGNESIUM 20 MG/1
1 CAPSULE CAPSULE, DELAYED RELEASE PELLETS ORAL ONCE A DAY
Qty: 90 CAPSULE | Refills: 3 | Status: ACTIVE | COMMUNITY
Start: 2021-09-08

## 2025-07-31 RX ORDER — FAMOTIDINE 40 MG/1
1 TABLET BEFORE SUPPER TABLET, FILM COATED ORAL ONCE A DAY
Qty: 90 TABLET | Refills: 3 | Status: ACTIVE | COMMUNITY
Start: 2024-06-26

## 2025-07-31 RX ORDER — ESOMEPRAZOLE MAGNESIUM 40 MG/1
1 CAPSULE CAPSULE, DELAYED RELEASE ORAL TWICE A DAY
Qty: 180 CAPSULE | Refills: 3 | Status: ACTIVE | COMMUNITY
Start: 2025-05-20

## 2025-07-31 RX ORDER — PANTOPRAZOLE SODIUM 40 MG/1
1 TABLET TABLET, DELAYED RELEASE ORAL ONCE A DAY
Qty: 90 TABLET | Refills: 3 | Status: ACTIVE | COMMUNITY
Start: 2025-04-21

## 2025-07-31 RX ORDER — ESOMEPRAZOLE MAGNESIUM 40 MG
TAKE 1 CAPSULE (40 MG) BY ORAL ROUTE ONCE DAILY CAPSULE,DELAYED RELEASE (ENTERIC COATED) ORAL
Qty: 90 | Refills: 3 | Status: ON HOLD | COMMUNITY
Start: 2019-04-19

## 2025-07-31 RX ORDER — SERTRALINE HCL 50 MG
TABLET ORAL
Qty: 0 | Refills: 0 | Status: ACTIVE | COMMUNITY
Start: 1900-01-01

## 2025-07-31 RX ORDER — SUCRALFATE 1 G/1
1 TABLET CRUSHED AND MIXED WITH 30ML OF WATER TABLET ORAL
Qty: 180 TABLET | Refills: 3 | Status: ACTIVE | COMMUNITY
Start: 2024-08-21 | End: 2025-08-16

## 2025-07-31 RX ORDER — HYDROCORTISONE ACETATE 30 MG/1
1 SUPPOSITORY SUPPOSITORY RECTAL TWICE A DAY
Qty: 60 | Refills: 3 | Status: ACTIVE | COMMUNITY
Start: 2025-04-21 | End: 2025-08-19

## 2025-07-31 RX ORDER — BUPROPION HYDROCHLORIDE 150 MG/1
TABLET, FILM COATED ORAL
Qty: 0 | Refills: 0 | Status: ACTIVE | COMMUNITY
Start: 1900-01-01

## 2025-07-31 NOTE — HPI-TODAY'S VISIT:
7/31/2025 Beverly DAMICO GORE, a 65-year-old woman, presented for a chronic condition follow-up focused on constipation, reflux, and hemorrhoid irritation. She has been actively working to improve her bowel regularity by increasing her fiber and water intake, and she reported difficulty with bowel movements in the past. She previously relied on Linzess 145 but ran out and did not receive a refill, so she managed her symptoms through dietary changes. She noted that Miralax made her sick, and she tolerated Metamucil pills when mixed correctly. Her main concern is maintaining regular bowel movements, as she recognizes the importance of bowel regularity for her overall health. In addition to her constipation, she described a small hemorrhoid causing irritation and occasional pain, with a history of hemorrhoid banding 15 years ago. The current irritation is minor, and she is using prescribed steroid suppositories for seven days as directed. She is attentive to her symptoms and prefers to avoid further procedures unless absolutely necessary. Regarding her gastroesophageal reflux disease, she reported experiencing burning in her stomach and nausea, with a history of intolerance to pantoprazole and Zantac. She found better control with esomeprazole (Nexium) but is concerned about its interaction with Plavix, which she will need after an upcoming stent procedure. She is proactive about her medication management and plans to start low-dose Nexium (20 mg once daily) if needed for reflux after the stent, with Carafate as a backup if reflux worsens. She is prioritizing diet and hydration to minimize the need for medication. MB

## 2025-07-31 NOTE — PHYSICAL EXAM NECK/THYROID:
ED Time Seen By Provider Entered On:  2019 11:02     Performed On:  2019 11:02  by Yenifer Elise NP               Time Seen By Provider   Time Seen by Provider :   2019 11:02    Yenifer Elise NP - 2019 11:02                Electronically Signed On 2019 11:02  ___________________________________________________   Yenifer Elise NP     normal appearance , no deformities , trachea midline

## 2025-08-25 ENCOUNTER — TELEPHONE ENCOUNTER (OUTPATIENT)
Dept: URBAN - NONMETROPOLITAN AREA CLINIC 2 | Facility: CLINIC | Age: 65
End: 2025-08-25

## 2025-08-25 RX ORDER — LINACLOTIDE 72 UG/1
1 CAPSULE AT LEAST 30 MINUTES BEFORE THE FIRST MEAL OF THE DAY ON AN EMPTY STOMACH CAPSULE, GELATIN COATED ORAL ONCE A DAY
Qty: 90 CAPSULE | Refills: 3
Start: 2024-06-26 | End: 2026-08-20